# Patient Record
Sex: MALE | Race: WHITE | NOT HISPANIC OR LATINO | Employment: OTHER | URBAN - NONMETROPOLITAN AREA
[De-identification: names, ages, dates, MRNs, and addresses within clinical notes are randomized per-mention and may not be internally consistent; named-entity substitution may affect disease eponyms.]

---

## 2017-04-12 ENCOUNTER — OFFICE VISIT (OUTPATIENT)
Dept: INTERNAL MEDICINE | Facility: CLINIC | Age: 72
End: 2017-04-12

## 2017-04-12 VITALS
BODY MASS INDEX: 31.99 KG/M2 | DIASTOLIC BLOOD PRESSURE: 68 MMHG | WEIGHT: 236.2 LBS | SYSTOLIC BLOOD PRESSURE: 118 MMHG | RESPIRATION RATE: 16 BRPM | HEART RATE: 57 BPM | OXYGEN SATURATION: 97 % | HEIGHT: 72 IN

## 2017-04-12 DIAGNOSIS — E66.09 NON MORBID OBESITY DUE TO EXCESS CALORIES: ICD-10-CM

## 2017-04-12 DIAGNOSIS — I10 ESSENTIAL HYPERTENSION: Primary | ICD-10-CM

## 2017-04-12 DIAGNOSIS — E78.2 MIXED HYPERLIPIDEMIA: ICD-10-CM

## 2017-04-12 PROCEDURE — 99214 OFFICE O/P EST MOD 30 MIN: CPT | Performed by: INTERNAL MEDICINE

## 2017-04-12 PROCEDURE — G0438 PPPS, INITIAL VISIT: HCPCS | Performed by: INTERNAL MEDICINE

## 2017-04-12 NOTE — PROGRESS NOTES
QUICK REFERENCE INFORMATION:  The ABCs of the Annual Wellness Visit    Initial Medicare Wellness Visit    HEALTH RISK ASSESSMENT    1945    Recent Hospitalizations:  No recent hospitalization(s)..        Current Medical Providers:  Patient Care Team:  Christian Chowdhury DO as PCP - General (Internal Medicine)        Smoking Status:  History   Smoking Status   • Never Smoker   Smokeless Tobacco   • Never Used       Alcohol Consumption:  History   Alcohol Use No       Depression Screen:   PHQ-9 Depression Screening 4/12/2017   Little interest or pleasure in doing things 0   Feeling down, depressed, or hopeless 0   Trouble falling or staying asleep, or sleeping too much 0   Feeling tired or having little energy 0   Poor appetite or overeating 0   Feeling bad about yourself - or that you are a failure or have let yourself or your family down 0   Trouble concentrating on things, such as reading the newspaper or watching television 0   Moving or speaking so slowly that other people could have noticed. Or the opposite - being so fidgety or restless that you have been moving around a lot more than usual 0   Thoughts that you would be better off dead, or of hurting yourself in some way 0   PHQ-9 Total Score 0   If you checked off any problems, how difficult have these problems made it for you to do your work, take care of things at home, or get along with other people? Not difficult at all       Health Habits and Functional and Cognitive Screening:  Functional & Cognitive Status 4/12/2017   Do you have difficulty preparing food and eating? No   Do you have difficulty bathing yourself? No   Do you have difficulty getting dressed? No   Do you have difficulty using the toilet? No   Do you have difficulty moving around from place to place? No   In the past year have you fallen or experienced a near fall? No   Do you need help using the phone?  No   Are you deaf or do you have serious difficulty hearing?  No   Do you need  help with transportation? No   Do you need help shopping? No   Do you need help preparing meals?  No   Do you need help with housework?  No   Do you need help with laundry? No   Do you need help taking your medications? No   Do you need help managing money? No   Do you have difficulty concentrating, remembering or making decisions? No       Health Habits  Current Diet: Well Balanced Diet  Dental Exam: Not up to date  Eye Exam: Up to date  Exercise (times per week): 7 times per week  Current Exercise Activities Include: Walking          Does the patient have evidence of cognitive impairment? No    Asiprin use counseling: Does not need ASA (and currently is not on it)      Recent Lab Results:    Visual Acuity:  No exam data present    Age-appropriate Screening Schedule:  Refer to the list below for future screening recommendations based on patient's age, sex and/or medical conditions. Orders for these recommended tests are listed in the plan section. The patient has been provided with a written plan.    Health Maintenance   Topic Date Due   • LIPID PANEL  05/03/2017   • PNEUMOCOCCAL VACCINES (65+ LOW/MEDIUM RISK) (2 of 2 - PPSV23) 10/12/2017   • COLONOSCOPY  01/01/2020   • TDAP/TD VACCINES (3 - Td) 01/01/2026   • INFLUENZA VACCINE  Addressed   • ZOSTER VACCINE  Addressed        Subjective   History of Present Illness    Felix Gomez is a 71 y.o. male who presents for an Annual Wellness Visit.    The following portions of the patient's history were reviewed and updated as appropriate: allergies, current medications, past family history, past medical history, past social history, past surgical history and problem list.    Outpatient Medications Prior to Visit   Medication Sig Dispense Refill   • atorvastatin (LIPITOR) 40 MG tablet Take 1 tablet by mouth Daily. 90 tablet 3   • lisinopril-hydrochlorothiazide (PRINZIDE,ZESTORETIC) 10-12.5 MG per tablet Take 1 tablet by mouth Daily. 90 tablet 3     No facility-administered  "medications prior to visit.        Patient Active Problem List   Diagnosis   • Essential hypertension   • Mixed hyperlipidemia   • Sensorineural hearing loss (SNHL), bilateral   • Non morbid obesity due to excess calories       Advance Care Planning:  has NO advance directive - information provided to the patient today    Identification of Risk Factors:  Risk factors include: weight  and hearing limitations.    Review of Systems See  note      Compared to one year ago, the patient feels his physical health is better.  Compared to one year ago, the patient feels his mental health is better.    Objective     Physical Exam See  note      Vitals:    04/12/17 1357   BP: 118/68   BP Location: Left arm   Patient Position: Sitting   Cuff Size: Adult   Pulse: 57   Resp: 16   SpO2: 97%   Weight: 236 lb 3.2 oz (107 kg)   Height: 72\" (182.9 cm)       Body mass index is 32.03 kg/(m^2).  Discussed the patient's BMI with him. The BMI is above average; BMI management plan is completed.    Assessment/Plan   Patient Self-Management and Personalized Health Advice  The patient has been provided with information about: diet, exercise, weight management and designing advance directives and preventive services including:   · Advance directive, Exercise counseling provided, Influenza vaccine, Pneumococcal vaccine .    Visit Diagnoses:    ICD-10-CM ICD-9-CM   1. Essential hypertension I10 401.9   2. Non morbid obesity due to excess calories E66.09 278.00   3. Mixed hyperlipidemia E78.2 272.2       No orders of the defined types were placed in this encounter.      Outpatient Encounter Prescriptions as of 4/12/2017   Medication Sig Dispense Refill   • atorvastatin (LIPITOR) 40 MG tablet Take 1 tablet by mouth Daily. 90 tablet 3   • lisinopril-hydrochlorothiazide (PRINZIDE,ZESTORETIC) 10-12.5 MG per tablet Take 1 tablet by mouth Daily. 90 tablet 3     No facility-administered encounter medications on file as of 4/12/2017.  "       Reviewed use of high risk medication in the elderly: yes  Reviewed for potential of harmful drug interactions in the elderly: yes    Follow Up:  Return in about 6 months (around 10/12/2017) for Recheck.     An After Visit Summary and PPPS with all of these plans were given to the patient.

## 2017-04-12 NOTE — PATIENT INSTRUCTIONS
Medicare Wellness  Personal Prevention Plan of Service     Date of Office Visit:  2017  Encounter Provider:  Christian Chowdhury DO  Place of Service:  [facility]  Patient Name: Felix Gomez  :  1945    As part of the Medicare Wellness portion of your visit today, we are providing you with this personalized preventive plan of services (PPPS). This plan is based upon recommendations of the [place] Preventive Services Task Force (USPSTF) and the Advisory Committee on Immunization Practices (ACIP).    This lists the preventive care services that should be considered, and provides dates of when you are due. Items listed as completed are up-to-date and do not require any further intervention.    Health Maintenance   Topic Date Due   • LIPID PANEL  2017   • PNEUMOCOCCAL VACCINES (65+ LOW/MEDIUM RISK) (2 of 2 - PPSV23) 10/12/2017   • MEDICARE ANNUAL WELLNESS  2018   • COLONOSCOPY  2020   • TDAP/TD VACCINES (3 - Td) 2026   • INFLUENZA VACCINE  Addressed   • ZOSTER VACCINE  Addressed       No orders of the defined types were placed in this encounter.      Return in about 6 months (around 10/12/2017) for Recheck.

## 2017-04-12 NOTE — PROGRESS NOTES
"CC: follow-up for hypertension and hyperlipidemia    History:  Felix Gomez is a 71 y.o. male who presents today for follow-up for evaluation of the above:  He reports he has been doing very well without any acute illness.  He is continued to fish frequently and has had a good catch already this season.  He continues on his Zestoretic with good results and his blood pressure and no side effects.  He also continues on atorvastatin without side effects.  His actinic keratoses from previous cryotherapy have resolved.  He does continue to walk, though his weight remains elevated per his BMI.  He has not pursued hearing aids any further.    ROS:  Review of Systems   Constitutional: Negative for chills and fever.   HENT: Positive for hearing loss. Negative for congestion and sore throat.    Respiratory: Negative for cough and shortness of breath.    Cardiovascular: Negative for chest pain and palpitations.   Gastrointestinal: Negative for abdominal pain, constipation and nausea.   Musculoskeletal: Negative for back pain and gait problem.       Mr. Gomez  reports that he has never smoked. He has never used smokeless tobacco. He reports that he does not drink alcohol or use illicit drugs.      Current Outpatient Prescriptions:   •  atorvastatin (LIPITOR) 40 MG tablet, Take 1 tablet by mouth Daily., Disp: 90 tablet, Rfl: 3  •  lisinopril-hydrochlorothiazide (PRINZIDE,ZESTORETIC) 10-12.5 MG per tablet, Take 1 tablet by mouth Daily., Disp: 90 tablet, Rfl: 3      OBJECTIVE:  /68 (BP Location: Left arm, Patient Position: Sitting, Cuff Size: Adult)  Pulse 57  Resp 16  Ht 72\" (182.9 cm)  Wt 236 lb 3.2 oz (107 kg)  SpO2 97%  BMI 32.03 kg/m2   Physical Exam   Constitutional: He is oriented to person, place, and time. He appears well-nourished. No distress.   Cardiovascular: Normal rate, regular rhythm and normal heart sounds.    No murmur heard.  Pulmonary/Chest: Effort normal and breath sounds normal. He has no " wheezes.   Abdominal: Soft. There is no tenderness.   Neurological: He is alert and oriented to person, place, and time.   Psychiatric: He has a normal mood and affect.       Assessment/Plan    Diagnoses and all orders for this visit:    Essential hypertension  Well controlled, BP goal for age is <150/90 per JNC 8 guidelines and continue current medications    Non morbid obesity due to excess calories  Recommended careful attention to portion control and being careful about the types and timing of his meals for the purpose of weight management.    Mixed hyperlipidemia  On appropriate intensity statin per ACC/AHA guidelines.  We will continue this.      An After Visit Summary was printed and given to the patient at discharge.  Return in about 6 months (around 10/12/2017) for Recheck. Sooner if problems arise.         Christian Chowdhury D.O. 4/12/2017

## 2017-10-16 DIAGNOSIS — E78.2 MIXED HYPERLIPIDEMIA: ICD-10-CM

## 2017-10-16 RX ORDER — ATORVASTATIN CALCIUM 40 MG/1
40 TABLET, FILM COATED ORAL DAILY
Qty: 90 TABLET | Refills: 3 | OUTPATIENT
Start: 2017-10-16

## 2017-10-18 ENCOUNTER — TELEPHONE (OUTPATIENT)
Dept: INTERNAL MEDICINE | Facility: CLINIC | Age: 72
End: 2017-10-18

## 2017-11-14 ENCOUNTER — OFFICE VISIT (OUTPATIENT)
Dept: INTERNAL MEDICINE | Facility: CLINIC | Age: 72
End: 2017-11-14

## 2017-11-14 VITALS
DIASTOLIC BLOOD PRESSURE: 88 MMHG | HEIGHT: 72 IN | OXYGEN SATURATION: 100 % | RESPIRATION RATE: 16 BRPM | WEIGHT: 232 LBS | HEART RATE: 54 BPM | SYSTOLIC BLOOD PRESSURE: 160 MMHG | BODY MASS INDEX: 31.42 KG/M2

## 2017-11-14 DIAGNOSIS — Z23 ENCOUNTER FOR IMMUNIZATION: ICD-10-CM

## 2017-11-14 DIAGNOSIS — E66.09 NON MORBID OBESITY DUE TO EXCESS CALORIES: ICD-10-CM

## 2017-11-14 DIAGNOSIS — E78.2 MIXED HYPERLIPIDEMIA: ICD-10-CM

## 2017-11-14 DIAGNOSIS — I10 ESSENTIAL HYPERTENSION: Primary | ICD-10-CM

## 2017-11-14 PROCEDURE — 99214 OFFICE O/P EST MOD 30 MIN: CPT | Performed by: INTERNAL MEDICINE

## 2017-11-14 PROCEDURE — 90732 PPSV23 VACC 2 YRS+ SUBQ/IM: CPT | Performed by: INTERNAL MEDICINE

## 2017-11-14 PROCEDURE — 90471 IMMUNIZATION ADMIN: CPT | Performed by: INTERNAL MEDICINE

## 2017-11-14 RX ORDER — LISINOPRIL AND HYDROCHLOROTHIAZIDE 12.5; 1 MG/1; MG/1
1 TABLET ORAL DAILY
Qty: 90 TABLET | Refills: 0 | Status: SHIPPED | OUTPATIENT
Start: 2017-11-14

## 2017-11-14 RX ORDER — ATORVASTATIN CALCIUM 40 MG/1
40 TABLET, FILM COATED ORAL DAILY
Qty: 90 TABLET | Refills: 0 | Status: SHIPPED | OUTPATIENT
Start: 2017-11-14

## 2017-11-14 NOTE — PROGRESS NOTES
"CC: essential hypertension     History:  Felix Gomez is a 72 y.o. male who presents today for follow-up for evaluation of the above:  Mr. Gomez is very pleasant in office today and states he has been doing well without any acute illness/complaints. He has been out of his blood pressure medication for about a week now and that his BP has been running 150/80 to about 180/80, denies any acute hypertensive symptoms and states his BP is well controlled on his lisinopril-HCTZ. He continues on atorvastatin without side effects. He has also been watching his diet and has seen another few pounds of weight loss.    ROS:  Review of Systems   Respiratory: Negative for cough, choking and wheezing.    Cardiovascular: Negative for chest pain, palpitations and leg swelling.   Gastrointestinal: Negative for abdominal distention, abdominal pain and diarrhea.       Mr. Gomez  reports that he has never smoked. He has never used smokeless tobacco. He reports that he does not drink alcohol or use illicit drugs.      Current Outpatient Prescriptions:   •  atorvastatin (LIPITOR) 40 MG tablet, Take 1 tablet by mouth Daily., Disp: 90 tablet, Rfl: 3  •  lisinopril-hydrochlorothiazide (PRINZIDE,ZESTORETIC) 10-12.5 MG per tablet, Take 1 tablet by mouth Daily., Disp: 90 tablet, Rfl: 3      OBJECTIVE:  /88 (BP Location: Left arm, Patient Position: Sitting, Cuff Size: Adult)  Pulse 54  Resp 16  Ht 72\" (182.9 cm)  Wt 232 lb (105 kg)  SpO2 100%  BMI 31.46 kg/m2   Physical Exam   Constitutional: He appears well-developed and well-nourished.   Cardiovascular: Normal rate, regular rhythm and normal heart sounds.  Exam reveals no gallop and no friction rub.    No murmur heard.  Pulmonary/Chest: Effort normal and breath sounds normal. No respiratory distress. He has no wheezes. He has no rales.   Abdominal: Soft. Bowel sounds are normal. He exhibits no distension. There is no tenderness.       Glucose   Date Value Ref Range Status "   05/03/2016 91 70 - 100 mg/dL Final     Sodium   Date Value Ref Range Status   05/03/2016 139 135 - 145 mmol/L Final     Potassium   Date Value Ref Range Status   05/03/2016 4.4 3.5 - 5.3 mmol/L Final     CO2   Date Value Ref Range Status   05/03/2016 27 24 - 31 mmol/L Final     Chloride   Date Value Ref Range Status   05/03/2016 101 98 - 110 mmol/L Final     Anion Gap   Date Value Ref Range Status   05/03/2016 10 4 - 13 mmol/L Final     Creatinine   Date Value Ref Range Status   05/03/2016 1.11 0.5 - 1.4 mg/dL Final     BUN   Date Value Ref Range Status   05/03/2016 19 5 - 21 mg/dL Final     Calcium   Date Value Ref Range Status   05/03/2016 9.7 8.4 - 10.4 mg/dL Final     Alkaline Phosphatase   Date Value Ref Range Status   05/03/2016 52 24 - 120 Units/L Final     Total Protein   Date Value Ref Range Status   05/03/2016 7.6 6.3 - 8.7 g/dL Final     ALT (SGPT)   Date Value Ref Range Status   05/03/2016 30 0 - 54 Units/L Final     AST (SGOT)   Date Value Ref Range Status   05/03/2016 26 7 - 45 Units/L Final     Total Bilirubin   Date Value Ref Range Status   05/03/2016 0.6 0.1 - 1.0 mg/dL Final     Albumin   Date Value Ref Range Status   05/03/2016 4.5 3.5 - 5.0 g/dL Final         No results found for: CHOL  Lab Results   Component Value Date    TRIG 102 05/03/2016    TRIG 82 07/31/2015    TRIG 80 09/25/2014     Lab Results   Component Value Date    HDL 47 05/03/2016    HDL 42 07/31/2015    HDL 45 09/25/2014     No components found for: LDLDIRECTC  Lab Results   Component Value Date    LDLHDL 2.7 05/03/2016    LDLHDL 3.0 07/31/2015    LDLHDL 2.9 09/25/2014       Assessment/Plan    Diagnoses and all orders for this visit:    Essential hypertension  -     lisinopril-hydrochlorothiazide (PRINZIDE,ZESTORETIC) 10-12.5 MG per tablet; Take 1 tablet by mouth Daily.  Well controlled, BP goal for age is <150/90 per JNC 8 guidelines and continue current medications    Mixed hyperlipidemia  -     atorvastatin (LIPITOR) 40 MG  tablet; Take 1 tablet by mouth Daily.  Stable on moderate intensity statin therapy per ACC/AHA guidelines. He is due for labs, but will allow this to be done per his new doctor in [place].    Non morbid obesity due to excess calories  Recommended attention to portion control and being careful about the types and timing of meals for the purpose of weight management.    Encounter for immunization  -     Pneumococcal Polysaccharide Vaccine 23-Valent Greater Than or Equal To 3yo Subcutaneous / IM  This completes his pneumococcal vaccinations for life as he had the Prevnar on 10/12/16. He declines his flu shot.     Immunization History   Administered Date(s) Administered   • Pneumococcal Conjugate 13-Valent 10/12/2016   • Td 01/01/2016   • Tdap 01/01/2005     Healthcare Maintenance:    Colonoscopy: Done 7 years ago without abnormalities  Medicare Wellness Visit completed 4/12/2017.      An After Visit Summary was printed and given to the patient at discharge.  Return if symptoms worsen or fail to improve. Sooner if problems arise.         Christian Chowdhury D.O. 11/14/2017